# Patient Record
Sex: FEMALE | Race: WHITE | NOT HISPANIC OR LATINO
[De-identification: names, ages, dates, MRNs, and addresses within clinical notes are randomized per-mention and may not be internally consistent; named-entity substitution may affect disease eponyms.]

---
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
## 2018-03-10 ENCOUNTER — APPOINTMENT (OUTPATIENT)
Dept: PEDIATRICS | Facility: CLINIC | Age: 18
End: 2018-03-10
Payer: COMMERCIAL

## 2018-03-10 VITALS — TEMPERATURE: 98.6 F

## 2018-03-10 PROCEDURE — 86580 TB INTRADERMAL TEST: CPT

## 2018-03-10 PROCEDURE — 99213 OFFICE O/P EST LOW 20 MIN: CPT | Mod: 25

## 2018-03-11 RX ORDER — NORGESTIMATE AND ETHINYL ESTRADIOL 0.25-0.035
0.25-35 KIT ORAL
Qty: 28 | Refills: 0 | Status: COMPLETED | COMMUNITY
Start: 2017-05-22 | End: 2018-03-11

## 2018-03-12 ENCOUNTER — MED ADMIN CHARGE (OUTPATIENT)
Age: 18
End: 2018-03-12

## 2018-04-07 ENCOUNTER — APPOINTMENT (OUTPATIENT)
Dept: PEDIATRICS | Facility: CLINIC | Age: 18
End: 2018-04-07

## 2018-05-11 ENCOUNTER — APPOINTMENT (OUTPATIENT)
Dept: PEDIATRICS | Facility: CLINIC | Age: 18
End: 2018-05-11
Payer: COMMERCIAL

## 2018-05-11 PROCEDURE — 86580 TB INTRADERMAL TEST: CPT

## 2018-10-19 ENCOUNTER — APPOINTMENT (OUTPATIENT)
Dept: PEDIATRICS | Facility: CLINIC | Age: 18
End: 2018-10-19
Payer: COMMERCIAL

## 2018-10-19 VITALS — TEMPERATURE: 98.2 F

## 2018-10-19 DIAGNOSIS — L23.9 ALLERGIC CONTACT DERMATITIS, UNSPECIFIED CAUSE: ICD-10-CM

## 2018-10-19 PROCEDURE — 99213 OFFICE O/P EST LOW 20 MIN: CPT | Mod: 25

## 2018-10-20 PROBLEM — L23.9 ALLERGIC CONTACT DERMATITIS, UNSPECIFIED TRIGGER: Status: ACTIVE | Noted: 2018-10-20

## 2018-10-20 RX ORDER — HYDROCORTISONE 25 MG/G
2.5 OINTMENT TOPICAL TWICE DAILY
Qty: 1 | Refills: 0 | Status: ACTIVE | COMMUNITY
Start: 2018-10-20 | End: 1900-01-01

## 2018-10-20 NOTE — DISCUSSION/SUMMARY
[FreeTextEntry1] : Atopic dermatitis. Symptomatic treatment. If it does not improve hydrocortisone 2.5% once a day for the next 5-7 days as prescribed. Follow up if it does not improve. Followup if worse.

## 2018-10-20 NOTE — PHYSICAL EXAM
[NL] : normocephalic [de-identified] : Round dry nonerythematous patch on the right lower side of the back. The lesion is about 2 cm in diameter. It is nontender. No induration palpated. No vesicles or pustules are seen

## 2018-10-20 NOTE — HISTORY OF PRESENT ILLNESS
[de-identified] : Rash [FreeTextEntry6] : Patient was seen today for a rash on her lower back on the right side. The rash started about 7 days ago. It is somewhat itchy. It is painful to touch. There has been no blisters. Patient has not applied any cream. She has had no other rashes. Patient does not recall being in contact with anything different. She has had no other symptoms or complaints. No other concerns

## 2019-01-12 ENCOUNTER — APPOINTMENT (OUTPATIENT)
Dept: PEDIATRICS | Facility: CLINIC | Age: 19
End: 2019-01-12
Payer: COMMERCIAL

## 2019-01-12 VITALS
HEIGHT: 64 IN | SYSTOLIC BLOOD PRESSURE: 116 MMHG | WEIGHT: 119 LBS | BODY MASS INDEX: 20.32 KG/M2 | DIASTOLIC BLOOD PRESSURE: 64 MMHG | HEART RATE: 72 BPM

## 2019-01-12 PROCEDURE — 99395 PREV VISIT EST AGE 18-39: CPT

## 2019-01-12 PROCEDURE — 96160 PT-FOCUSED HLTH RISK ASSMT: CPT | Mod: 59

## 2019-01-12 PROCEDURE — 96127 BRIEF EMOTIONAL/BEHAV ASSMT: CPT

## 2019-01-13 NOTE — HISTORY OF PRESENT ILLNESS
[Mother] : mother [Goes to dentist yearly] : Patient goes to dentist yearly [Up to date] : Up to date [Normal] : normal [Eats meals with family] : eats meals with family [Is permitted and is able to make independent decisions] : Is permitted and is able to make independent decisions [Sleep Concerns] : no sleep concerns [Eats regular meals including adequate fruits and vegetables] : eats regular meals including adequate fruits and vegetables [Calcium source] : calcium source [Has friends] : has friends [At least 1 hour of physical activity a day] : at least 1 hour of physical activity a day [Screen time (except homework) less than 2 hours a day] : no screen time (except homework) less than 2 hours a day [Uses electronic nicotine delivery system] : does not use electronic nicotine delivery system [Exposure to electronic nicotine delivery system] : no exposure to electronic nicotine delivery system [Uses tobacco] : does not use tobacco [Uses drugs] : does not use drugs  [Exposure to drugs] : no exposure to drugs [Drinks alcohol] : does not drink alcohol [Exposure to alcohol] : no exposure to alcohol [Cigarette smoke exposure] : No cigarette smoke exposure [Uses safety belts/safety equipment] : uses safety belts/safety equipment  [Impaired/distracted driving] : no impaired/distracted driving [Has had sexual intercourse] : has not had sexual intercourse [Has ways to cope with stress] : has ways to cope with stress [Displays self-confidence] : displays self-confidence [Has problems with sleep] : does not have problems with sleep [Gets depressed, anxious, or irritable/has mood swings] : does not get depressed, anxious, or irritable/has mood swings [Has thought about hurting self or considered suicide] : has not thought about hurting self or considered suicide [With Teen] : teen [FreeTextEntry7] : No concerns [de-identified] : College [FreeTextEntry1] : The patient was seen today for a physical. She has no concerns. She is doing well academically and socially. She sees to GYN. She is on birth control pills. Patient does not complain of any headaches or bellyaches. No joint pains.

## 2019-01-13 NOTE — PHYSICAL EXAM

## 2019-01-13 NOTE — DISCUSSION/SUMMARY
[FreeTextEntry1] : routine care was discussed. Yearly exam.Sooner if any concerns. Followup with GYN. Safety issues discussed.

## 2019-03-22 ENCOUNTER — APPOINTMENT (OUTPATIENT)
Dept: PEDIATRICS | Facility: CLINIC | Age: 19
End: 2019-03-22
Payer: COMMERCIAL

## 2019-03-22 VITALS — TEMPERATURE: 100.2 F

## 2019-03-22 DIAGNOSIS — B34.9 VIRAL INFECTION, UNSPECIFIED: ICD-10-CM

## 2019-03-22 DIAGNOSIS — Z87.09 PERSONAL HISTORY OF OTHER DISEASES OF THE RESPIRATORY SYSTEM: ICD-10-CM

## 2019-03-22 LAB
FLUAV SPEC QL CULT: NORMAL
FLUBV AG SPEC QL IA: NORMAL
S PYO AG SPEC QL IA: NORMAL

## 2019-03-22 PROCEDURE — 87804 INFLUENZA ASSAY W/OPTIC: CPT | Mod: 59,QW

## 2019-03-22 PROCEDURE — 99214 OFFICE O/P EST MOD 30 MIN: CPT

## 2019-03-22 PROCEDURE — 87880 STREP A ASSAY W/OPTIC: CPT | Mod: QW

## 2019-03-22 RX ORDER — EFINACONAZOLE 100 MG/ML
10 SOLUTION TOPICAL
Qty: 8 | Refills: 0 | Status: ACTIVE | COMMUNITY
Start: 2019-01-17

## 2019-03-22 NOTE — DISCUSSION/SUMMARY
[FreeTextEntry1] : Viral illness. Pharyngitis. Rapid strep was negative. Culture is pending. Influenza test was also negative. Symptomatic treatment. Followup if not better over the next few days. Sooner if worse. Since patient is going off to college in 2 days both patient and mom felt better starting an antibiotic for the pharyngitis until the culture comes back. Patient will be started on amoxicillin 875 mg b.i.d. for 10 days. Will follow up with the results of the culture.

## 2019-03-22 NOTE — HISTORY OF PRESENT ILLNESS
[de-identified] : Fever congestion and sore throat [FreeTextEntry6] : Patient was seen today for fever congestion and sore throat. Patient just returned yesterday from a trip to [place]. She is feeling achy. Tired. Low-grade temperature. She is complaining of a sore throat. She is complaining of some congestion. No coughing. she has been on no medications other than decongestants and ibuprofen.She is complaining of slight headache. No rashes. No other symptoms or complaints.

## 2019-03-24 ENCOUNTER — MESSAGE (OUTPATIENT)
Age: 19
End: 2019-03-24

## 2019-03-25 LAB — BACTERIA THROAT CULT: NORMAL

## 2019-03-27 ENCOUNTER — APPOINTMENT (OUTPATIENT)
Dept: PEDIATRICS | Facility: CLINIC | Age: 19
End: 2019-03-27
Payer: COMMERCIAL

## 2019-03-27 VITALS — TEMPERATURE: 98.2 F

## 2019-03-27 DIAGNOSIS — J32.9 CHRONIC SINUSITIS, UNSPECIFIED: ICD-10-CM

## 2019-03-27 PROCEDURE — 99214 OFFICE O/P EST MOD 30 MIN: CPT

## 2019-03-27 NOTE — HISTORY OF PRESENT ILLNESS
[de-identified] : Congestion [FreeTextEntry6] : Patient was seen today for congestion. Patient is still not feeling well. She has tried over-the-counter medications along with amoxicillin. She has had recurrent nasal discharge. She is complaining of sinus pressure headache. Patient just wanted to make sure her lungs are clear since she is going back to college today. She has had no difficulty breathing. No shortness of breath. Patient has been otherwise asymptomatic.

## 2019-03-27 NOTE — DISCUSSION/SUMMARY
[FreeTextEntry1] : Sinusitis. A prescription for Augmentin 875 mg b.i.d. was prescribed. Followup if not better over the next few days. Sooner if worse. Continue Flonase.

## 2019-05-10 ENCOUNTER — APPOINTMENT (OUTPATIENT)
Dept: PEDIATRICS | Facility: CLINIC | Age: 19
End: 2019-05-10
Payer: COMMERCIAL

## 2019-05-10 PROCEDURE — 86580 TB INTRADERMAL TEST: CPT

## 2019-12-12 ENCOUNTER — MESSAGE (OUTPATIENT)
Age: 19
End: 2019-12-12

## 2019-12-26 NOTE — BEGINNING OF VISIT
[Mother] : mother [At Term] : at term [ Section] : by  section [Age Appropriate] : age appropriate developmental milestones not met [FreeTextEntry1] : 7-11 [de-identified] : repeat [FreeTextEntry5] : EIP will assess him [FreeTextEntry4] : diabetic mother

## 2020-01-25 ENCOUNTER — APPOINTMENT (OUTPATIENT)
Dept: PEDIATRICS | Facility: CLINIC | Age: 20
End: 2020-01-25
Payer: COMMERCIAL

## 2020-01-25 VITALS
DIASTOLIC BLOOD PRESSURE: 70 MMHG | BODY MASS INDEX: 21.26 KG/M2 | WEIGHT: 123 LBS | HEART RATE: 70 BPM | HEIGHT: 63.7 IN | SYSTOLIC BLOOD PRESSURE: 116 MMHG

## 2020-01-25 DIAGNOSIS — Z00.00 ENCOUNTER FOR GENERAL ADULT MEDICAL EXAMINATION W/OUT ABNORMAL FINDINGS: ICD-10-CM

## 2020-01-25 PROCEDURE — 99395 PREV VISIT EST AGE 18-39: CPT

## 2020-01-25 PROCEDURE — 96127 BRIEF EMOTIONAL/BEHAV ASSMT: CPT

## 2020-01-25 PROCEDURE — 96160 PT-FOCUSED HLTH RISK ASSMT: CPT | Mod: 59

## 2020-01-26 NOTE — HISTORY OF PRESENT ILLNESS
[Up to date] : Up to date [Normal] : normal [Eats meals with family] : eats meals with family [Sleep Concerns] : no sleep concerns [Is permitted and is able to make independent decisions] : Is permitted and is able to make independent decisions [Normal Behavior/Attention] : normal behavior/attention [Normal Performance] : normal performance [Calcium source] : calcium source [Normal Homework] : normal homework [Eats regular meals including adequate fruits and vegetables] : eats regular meals including adequate fruits and vegetables [Exposure to electronic nicotine delivery system] : no exposure to electronic nicotine delivery system [Uses electronic nicotine delivery system] : does not use electronic nicotine delivery system [Exposure to tobacco] : no exposure to tobacco [Uses tobacco] : does not use tobacco [Uses drugs] : does not use drugs  [Exposure to drugs] : no exposure to drugs [Exposure to alcohol] : exposure to alcohol [Impaired/distracted driving] : no impaired/distracted driving [Uses safety belts/safety equipment] : uses safety belts/safety equipment  [No] : Patient has not had sexual intercourse. [HIV Screening Declined] : HIV Screening Declined [Has ways to cope with stress] : has ways to cope with stress [Displays self-confidence] : displays self-confidence [Has problems with sleep] : does not have problems with sleep [Has thought about hurting self or considered suicide] : has not thought about hurting self or considered suicide [Gets depressed, anxious, or irritable/has mood swings] : does not get depressed, anxious, or irritable/has mood swings [With Teen] : teen [de-identified] : self [de-identified] : no Concerns [FreeTextEntry8] : Sees GYN. Patient on birth control pills [de-identified] : College [de-identified] : Occasional social drinking [FreeTextEntry1] : Patient was seen today for a physical. She has been doing well academically and socially. She does not complain of any headaches or bony aches. No joint pains.

## 2020-01-26 NOTE — PHYSICAL EXAM
[Alert] : alert [Normocephalic] : normocephalic [No Acute Distress] : no acute distress [EOMI Bilateral] : EOMI bilateral [Nonerythematous Oropharynx] : nonerythematous oropharynx [Clear tympanic membranes with bony landmarks and light reflex present bilaterally] : clear tympanic membranes with bony landmarks and light reflex present bilaterally  [Pink Nasal Mucosa] : pink nasal mucosa [No Palpable Masses] : no palpable masses [Supple, full passive range of motion] : supple, full passive range of motion [Normal S1, S2 audible] : normal S1, S2 audible [Clear to Auscultation Bilaterally] : clear to auscultation bilaterally [Regular Rate and Rhythm] : regular rate and rhythm [No Murmurs] : no murmurs [+2 Femoral Pulses] : +2 femoral pulses [Soft] : soft [NonTender] : non tender [Normoactive Bowel Sounds] : normoactive bowel sounds [Non Distended] : non distended [No Hepatomegaly] : no hepatomegaly [Normal Muscle Tone] : normal muscle tone [No Abnormal Lymph Nodes Palpated] : no abnormal lymph nodes palpated [No Splenomegaly] : no splenomegaly [No pain or deformities with palpation of bone, muscles, joints] : no pain or deformities with palpation of bone, muscles, joints [No Gait Asymmetry] : no gait asymmetry [Cranial Nerves Grossly Intact] : cranial nerves grossly intact [+2 Patella DTR] : +2 patella DTR [Straight] : straight [No Rash or Lesions] : no rash or lesions [FreeTextEntry6] : Not examined seeing GYN [de-identified] : Not examined seeing GYN

## 2020-01-26 NOTE — DISCUSSION/SUMMARY
[FreeTextEntry1] : Routine care discussed. Yearly exam. Sooner if any concerns. Followup with GYN. Followup with ophthalmology

## 2020-06-22 ENCOUNTER — RESULT REVIEW (OUTPATIENT)
Age: 20
End: 2020-06-22

## 2020-07-13 ENCOUNTER — APPOINTMENT (OUTPATIENT)
Dept: PEDIATRICS | Facility: CLINIC | Age: 20
End: 2020-07-13

## 2020-07-14 LAB
M TB IFN-G BLD-IMP: NEGATIVE
QUANTIFERON TB PLUS MITOGEN MINUS NIL: 6.5 IU/ML
QUANTIFERON TB PLUS NIL: 0.01 IU/ML
QUANTIFERON TB PLUS TB1 MINUS NIL: 0 IU/ML
QUANTIFERON TB PLUS TB2 MINUS NIL: 0 IU/ML

## 2020-08-11 ENCOUNTER — APPOINTMENT (OUTPATIENT)
Dept: PEDIATRICS | Facility: CLINIC | Age: 20
End: 2020-08-11
Payer: COMMERCIAL

## 2020-08-11 PROCEDURE — 90471 IMMUNIZATION ADMIN: CPT

## 2020-08-11 PROCEDURE — 90715 TDAP VACCINE 7 YRS/> IM: CPT

## 2020-09-12 ENCOUNTER — APPOINTMENT (OUTPATIENT)
Dept: PEDIATRICS | Facility: CLINIC | Age: 20
End: 2020-09-12

## 2020-11-03 ENCOUNTER — APPOINTMENT (OUTPATIENT)
Dept: PEDIATRICS | Facility: CLINIC | Age: 20
End: 2020-11-03
Payer: COMMERCIAL

## 2020-11-03 DIAGNOSIS — Z23 ENCOUNTER FOR IMMUNIZATION: ICD-10-CM

## 2020-11-03 PROCEDURE — 90471 IMMUNIZATION ADMIN: CPT

## 2020-11-03 PROCEDURE — 90651 9VHPV VACCINE 2/3 DOSE IM: CPT

## 2020-12-21 PROBLEM — Z87.09 HISTORY OF PHARYNGITIS: Status: RESOLVED | Noted: 2017-05-26 | Resolved: 2020-12-21

## 2021-01-05 ENCOUNTER — APPOINTMENT (OUTPATIENT)
Dept: PEDIATRICS | Facility: CLINIC | Age: 21
End: 2021-01-05
Payer: COMMERCIAL

## 2021-01-05 VITALS — TEMPERATURE: 97.5 F

## 2021-01-05 DIAGNOSIS — Z20.828 CONTACT WITH AND (SUSPECTED) EXPOSURE TO OTHER VIRAL COMMUNICABLE DISEASES: ICD-10-CM

## 2021-01-05 DIAGNOSIS — Z20.822 CONTACT WITH AND (SUSPECTED) EXPOSURE TO COVID-19: ICD-10-CM

## 2021-01-05 PROCEDURE — 99072 ADDL SUPL MATRL&STAF TM PHE: CPT

## 2021-01-05 PROCEDURE — 99213 OFFICE O/P EST LOW 20 MIN: CPT

## 2021-01-05 RX ORDER — AMOXICILLIN AND CLAVULANATE POTASSIUM 875; 125 MG/1; MG/1
875-125 TABLET, COATED ORAL
Qty: 20 | Refills: 0 | Status: DISCONTINUED | COMMUNITY
Start: 2019-03-27 | End: 2021-01-05

## 2021-01-05 RX ORDER — AMOXICILLIN 875 MG/1
875 TABLET, FILM COATED ORAL
Qty: 20 | Refills: 0 | Status: DISCONTINUED | COMMUNITY
Start: 2019-03-22 | End: 2021-01-05

## 2021-01-05 NOTE — HISTORY OF PRESENT ILLNESS
[de-identified] : Covid exposure [FreeTextEntry6] : 21y/o young woman presents for Covid testing s/p exposure on 12/31/2020 to a friend who subsequently tested positive. Pt is without symptoms. No fever. No cough, congestion, SOB or URI sx. No n/v/d. No headache, abdominal pain or rash. No body aches or fatigue. No loss of smell or taste. Good po/uop/bm. Normal sleep and activity.\par

## 2021-01-05 NOTE — PHYSICAL EXAM
[FROM] : full passive range of motion [Moves All Extremities x 4] : moves all extremities x4 [NL] : warm [FreeTextEntry7] : no increased work of breathing

## 2021-01-05 NOTE — DISCUSSION/SUMMARY
[FreeTextEntry1] : Covid PCR sent to lab.\par Will follow up by phone once results are available.\par Parent/pt aware of need to quarantine regardless of test results.\par

## 2021-01-08 ENCOUNTER — NON-APPOINTMENT (OUTPATIENT)
Age: 21
End: 2021-01-08

## 2021-01-08 LAB — SARS-COV-2 N GENE NPH QL NAA+PROBE: NOT DETECTED

## 2021-01-12 ENCOUNTER — NON-APPOINTMENT (OUTPATIENT)
Age: 21
End: 2021-01-12

## 2021-01-13 ENCOUNTER — APPOINTMENT (OUTPATIENT)
Dept: PEDIATRICS | Facility: CLINIC | Age: 21
End: 2021-01-13
Payer: COMMERCIAL

## 2021-01-13 VITALS — TEMPERATURE: 98.2 F

## 2021-01-13 DIAGNOSIS — Z20.822 CONTACT WITH AND (SUSPECTED) EXPOSURE TO COVID-19: ICD-10-CM

## 2021-01-13 PROCEDURE — 99072 ADDL SUPL MATRL&STAF TM PHE: CPT

## 2021-01-13 PROCEDURE — 99212 OFFICE O/P EST SF 10 MIN: CPT

## 2021-01-13 NOTE — HISTORY OF PRESENT ILLNESS
[de-identified] : Covid exposure [FreeTextEntry6] : 21y/o girl presents for Covid testing s/p exposure 5 days ago. Pt is without symptoms. No fever. No cough, congestion, SOB or URI sx. No n/v/d. No headache, abdominal pain, sore throat or rash. No body aches or fatigue. No loss of smell or taste. Good po/uop/bm. Normal sleep and activity.\par

## 2021-01-15 LAB — SARS-COV-2 N GENE NPH QL NAA+PROBE: NOT DETECTED

## 2021-06-23 ENCOUNTER — APPOINTMENT (OUTPATIENT)
Dept: OBGYN | Facility: CLINIC | Age: 21
End: 2021-06-23

## 2021-11-05 ENCOUNTER — APPOINTMENT (OUTPATIENT)
Dept: OBGYN | Facility: CLINIC | Age: 21
End: 2021-11-05
Payer: COMMERCIAL

## 2021-11-05 VITALS — HEIGHT: 64 IN | DIASTOLIC BLOOD PRESSURE: 70 MMHG | SYSTOLIC BLOOD PRESSURE: 108 MMHG

## 2021-11-05 DIAGNOSIS — Z01.419 ENCOUNTER FOR GYNECOLOGICAL EXAMINATION (GENERAL) (ROUTINE) W/OUT ABNORMAL FINDINGS: ICD-10-CM

## 2021-11-05 PROCEDURE — 99395 PREV VISIT EST AGE 18-39: CPT

## 2021-11-10 LAB — CYTOLOGY CVX/VAG DOC THIN PREP: NORMAL

## 2022-11-10 ENCOUNTER — APPOINTMENT (OUTPATIENT)
Dept: OBGYN | Facility: CLINIC | Age: 22
End: 2022-11-10

## 2022-11-10 VITALS
HEIGHT: 64 IN | SYSTOLIC BLOOD PRESSURE: 115 MMHG | WEIGHT: 122 LBS | DIASTOLIC BLOOD PRESSURE: 77 MMHG | BODY MASS INDEX: 20.83 KG/M2

## 2022-11-10 PROCEDURE — 99395 PREV VISIT EST AGE 18-39: CPT

## 2022-11-14 LAB
C TRACH RRNA SPEC QL NAA+PROBE: NOT DETECTED
N GONORRHOEA RRNA SPEC QL NAA+PROBE: NOT DETECTED
SOURCE TP AMPLIFICATION: NORMAL

## 2022-11-17 LAB — CYTOLOGY CVX/VAG DOC THIN PREP: NORMAL

## 2022-12-06 ENCOUNTER — NON-APPOINTMENT (OUTPATIENT)
Age: 22
End: 2022-12-06

## 2023-03-06 ENCOUNTER — NON-APPOINTMENT (OUTPATIENT)
Age: 23
End: 2023-03-06

## 2023-09-27 NOTE — REVIEW OF SYSTEMS
[FreeTextEntry1] : review of system normal other than stated in the history of present illness Z Plasty Text: The lesion was extirpated to the level of the fat with a #15 scalpel blade.  Given the location of the defect, shape of the defect and the proximity to free margins a Z-plasty was deemed most appropriate for repair.  Using a sterile surgical marker, the appropriate transposition arms of the Z-plasty were drawn incorporating the defect and placing the expected incisions within the relaxed skin tension lines where possible.    The area thus outlined was incised deep to adipose tissue with a #15 scalpel blade.  The skin margins were undermined to an appropriate distance in all directions utilizing iris scissors.  The opposing transposition arms were then transposed into place in opposite direction and anchored with interrupted buried subcutaneous sutures.

## 2023-11-16 ENCOUNTER — OFFICE (OUTPATIENT)
Dept: URBAN - METROPOLITAN AREA CLINIC 100 | Facility: CLINIC | Age: 23
Setting detail: OPHTHALMOLOGY
End: 2023-11-16
Payer: COMMERCIAL

## 2023-11-16 DIAGNOSIS — H01.004: ICD-10-CM

## 2023-11-16 DIAGNOSIS — H00.11: ICD-10-CM

## 2023-11-16 DIAGNOSIS — H00.14: ICD-10-CM

## 2023-11-16 DIAGNOSIS — H01.001: ICD-10-CM

## 2023-11-16 PROCEDURE — 92285 EXTERNAL OCULAR PHOTOGRAPHY: CPT | Performed by: OPHTHALMOLOGY

## 2023-11-16 PROCEDURE — 92002 INTRM OPH EXAM NEW PATIENT: CPT | Performed by: OPHTHALMOLOGY

## 2023-11-16 ASSESSMENT — CONFRONTATIONAL VISUAL FIELD TEST (CVF)
OD_FINDINGS: FULL
OS_FINDINGS: FULL

## 2023-11-16 ASSESSMENT — LID EXAM ASSESSMENTS
OS_BLEPHARITIS: LUL
OD_BLEPHARITIS: RUL

## 2023-12-05 ENCOUNTER — APPOINTMENT (OUTPATIENT)
Dept: OBGYN | Facility: CLINIC | Age: 23
End: 2023-12-05
Payer: COMMERCIAL

## 2023-12-05 VITALS
WEIGHT: 120 LBS | SYSTOLIC BLOOD PRESSURE: 104 MMHG | BODY MASS INDEX: 20.49 KG/M2 | HEIGHT: 64 IN | DIASTOLIC BLOOD PRESSURE: 73 MMHG

## 2023-12-05 DIAGNOSIS — Z01.419 ENCOUNTER FOR GYNECOLOGICAL EXAMINATION (GENERAL) (ROUTINE) W/OUT ABNORMAL FINDINGS: ICD-10-CM

## 2023-12-05 PROCEDURE — 99395 PREV VISIT EST AGE 18-39: CPT

## 2023-12-11 LAB — CYTOLOGY CVX/VAG DOC THIN PREP: NORMAL

## 2024-05-22 ENCOUNTER — NON-APPOINTMENT (OUTPATIENT)
Age: 24
End: 2024-05-22

## 2024-07-08 ENCOUNTER — NON-APPOINTMENT (OUTPATIENT)
Age: 24
End: 2024-07-08

## 2024-11-14 ENCOUNTER — APPOINTMENT (OUTPATIENT)
Dept: OBGYN | Facility: CLINIC | Age: 24
End: 2024-11-14
Payer: COMMERCIAL

## 2024-11-14 VITALS — DIASTOLIC BLOOD PRESSURE: 67 MMHG | SYSTOLIC BLOOD PRESSURE: 96 MMHG

## 2024-11-14 DIAGNOSIS — N93.8 OTHER SPECIFIED ABNORMAL UTERINE AND VAGINAL BLEEDING: ICD-10-CM

## 2024-11-14 PROCEDURE — 99214 OFFICE O/P EST MOD 30 MIN: CPT

## 2024-11-14 PROCEDURE — 99459 PELVIC EXAMINATION: CPT

## 2024-11-14 RX ORDER — NORETHINDRONE ACETATE AND ETHINYL ESTRADIOL 1MG-20(24)
1-20 KIT ORAL DAILY
Qty: 3 | Refills: 3 | Status: ACTIVE | COMMUNITY
Start: 2024-11-14 | End: 1900-01-01

## 2024-11-15 LAB
FSH SERPL-MCNC: 7.2 IU/L
LH SERPL-ACNC: 7.1 IU/L
PROLACTIN SERPL-MCNC: 6.7 NG/ML
T4 FREE SERPL-MCNC: 1.4 NG/DL
TSH SERPL-ACNC: 1.18 UIU/ML

## 2024-11-18 LAB
C TRACH RRNA SPEC QL NAA+PROBE: NOT DETECTED
N GONORRHOEA RRNA SPEC QL NAA+PROBE: NOT DETECTED
SOURCE AMPLIFICATION: NORMAL

## 2024-11-29 ENCOUNTER — EMERGENCY (EMERGENCY)
Facility: HOSPITAL | Age: 24
LOS: 1 days | Discharge: HOME | End: 2024-11-29
Attending: EMERGENCY MEDICINE
Payer: COMMERCIAL

## 2024-11-29 VITALS
SYSTOLIC BLOOD PRESSURE: 124 MMHG | WEIGHT: 119.93 LBS | HEART RATE: 89 BPM | TEMPERATURE: 98 F | RESPIRATION RATE: 18 BRPM | DIASTOLIC BLOOD PRESSURE: 84 MMHG | OXYGEN SATURATION: 98 % | HEIGHT: 64 IN

## 2024-11-29 DIAGNOSIS — N94.2 VAGINISMUS: Chronic | ICD-10-CM

## 2024-11-29 PROCEDURE — 99282 EMERGENCY DEPT VISIT SF MDM: CPT

## 2024-11-29 PROCEDURE — 99053 MED SERV 10PM-8AM 24 HR FAC: CPT

## 2024-11-29 PROCEDURE — 99283 EMERGENCY DEPT VISIT LOW MDM: CPT

## 2024-11-29 RX ORDER — ERYTHROMYCIN BASE 5 MG/GRAM
1 OINTMENT (GRAM) OPHTHALMIC (EYE)
Qty: 1 | Refills: 0
Start: 2024-11-29 | End: 2024-12-05

## 2024-11-29 NOTE — ED ADULT NURSE NOTE - OBJECTIVE STATEMENT
The pt is a 24 Y F with no PMH. 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 Pt states that she washed her eyes out right away and still co burning in both eyes. Pt is AOx4 breathing evenly on room air and able to speak in full sentences. pt denies head ache fevers, chills, n/v/d/c/ numbness and tingling. pt was able to ambulate on her own to the room VSS and pt in a stretcher with comfort and safety measures provided,

## 2024-11-29 NOTE — ED PROVIDER NOTE - PHYSICAL EXAMINATION
GENERAL: NAD  HEENT:  Atraumatic. EOM intact. non erythematous conjunctiva, PEERLA  CHEST/LUNG: Chest rise equal bilaterally  HEART: Regular rate and rhythm  EXTREMITIES:  Extremities warm  PSYCH: A&Ox3

## 2024-11-29 NOTE — ED ADULT TRIAGE NOTE - CHIEF COMPLAINT QUOTE
exposure to amniotic fluid to b/l eyes, pt reports flushing eyes immediate post exposure, reports stinging sensation to b/l eyes

## 2024-11-29 NOTE — ED PROVIDER NOTE - PATIENT PORTAL LINK FT
You can access the FollowMyHealth Patient Portal offered by [facility] by registering at the following website: [email or web address]. By joining [employer]’s FollowMyHealth portal, you will also be able to view your health information using other applications (apps) compatible with our system.

## 2024-11-29 NOTE — ED ADULT TRIAGE NOTE - GLASGOW COMA SCALE: BEST MOTOR RESPONSE, MLM
Progress Notes by Elaina Warner NCMA at 03/23/18 10:23 AM     Author:  Elaina Warner NCMA Service:  (none) Author Type:  Certified Medical Assistant     Filed:  03/23/18 10:23 AM Encounter Date:  3/23/2018 Status:  Signed     :  Elaina Warner NCMA (Certified Medical Assistant)              Ramana Pacheco received[KS1.1T] Td 0.5[KS1.1M]mg injection and was administered uneventfully.    We have recommended to patient/parent/guardian that they should wait 15 minutes after receiving an injection.     Electronically Signed by:    ALEX Galvan , 3/23/2018[KS1.1T]      Revision History        User Key Date/Time User Provider Type Action    > KS1.1 03/23/18 10:23 AM Elaina Warner NCMA Certified Medical Assistant Sign    M - Manual, T - Template             (M6) obeys commands

## 2024-11-29 NOTE — ED PROVIDER NOTE - ATTENDING CONTRIBUTION TO CARE
Pt with amniotic fluid to eye prior to arrival, +eye wash, no sxs currently.  Immniz UTD.      PE: well appearing, nontoxic, no respiratory distress.  Neuro nonfocal.  Skin intact. Psych normal mood.    MDM: bodily fluid exposure to eyes, low but present risk for bacterial infection, start empiric optho antibiotics.

## 2024-11-29 NOTE — ED PROVIDER NOTE - OBJECTIVE STATEMENT
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  Patient was delivering an about 3 cc of amniotic fluid splashed into her eyes.  15 minutes after exposure she went to the eyewash station for 1 minute.  Denies eye pain, no visual changes, no eye redness, no eye irritation.  Patient states the sores is confirmed HIV negative, and hepatitis negative.

## 2024-12-05 ENCOUNTER — APPOINTMENT (OUTPATIENT)
Dept: OBGYN | Facility: CLINIC | Age: 24
End: 2024-12-05
Payer: COMMERCIAL

## 2024-12-05 ENCOUNTER — ASOB RESULT (OUTPATIENT)
Age: 24
End: 2024-12-05

## 2024-12-05 PROCEDURE — 76830 TRANSVAGINAL US NON-OB: CPT

## 2024-12-12 ENCOUNTER — NON-APPOINTMENT (OUTPATIENT)
Age: 24
End: 2024-12-12

## 2024-12-29 NOTE — ED PROVIDER NOTE - CLINICAL SUMMARY MEDICAL DECISION MAKING FREE TEXT BOX
Pt is afebrile, well-appearing, with no visual symptoms in ED.  Though patient knows source is HIV negative and hepatitis negative wants to get tested herself.  Offered PEP prophylaxis, patient declined due to low risk.  Patient understands risks and benefits.  Will draw blood and DC home. Will prescribe eye abx ointment.
.

## 2025-01-23 ENCOUNTER — NON-APPOINTMENT (OUTPATIENT)
Age: 25
End: 2025-01-23

## 2025-01-23 ENCOUNTER — APPOINTMENT (OUTPATIENT)
Dept: OBGYN | Facility: CLINIC | Age: 25
End: 2025-01-23
Payer: COMMERCIAL

## 2025-01-23 VITALS
BODY MASS INDEX: 21 KG/M2 | DIASTOLIC BLOOD PRESSURE: 66 MMHG | SYSTOLIC BLOOD PRESSURE: 106 MMHG | HEIGHT: 64 IN | WEIGHT: 123 LBS

## 2025-01-23 DIAGNOSIS — Z01.419 ENCOUNTER FOR GYNECOLOGICAL EXAMINATION (GENERAL) (ROUTINE) W/OUT ABNORMAL FINDINGS: ICD-10-CM

## 2025-01-23 DIAGNOSIS — Z13.31 ENCOUNTER FOR SCREENING FOR DEPRESSION: ICD-10-CM

## 2025-01-23 DIAGNOSIS — Z30.41 ENCOUNTER FOR SURVEILLANCE OF CONTRACEPTIVE PILLS: ICD-10-CM

## 2025-01-23 PROCEDURE — 99459 PELVIC EXAMINATION: CPT

## 2025-01-23 PROCEDURE — 99395 PREV VISIT EST AGE 18-39: CPT

## 2025-01-23 PROCEDURE — G0444 DEPRESSION SCREEN ANNUAL: CPT | Mod: 59

## 2025-01-28 LAB — CYTOLOGY CVX/VAG DOC THIN PREP: NORMAL

## 2025-02-04 ENCOUNTER — EMERGENCY (EMERGENCY)
Facility: HOSPITAL | Age: 25
LOS: 1 days | Discharge: HOME | End: 2025-02-04
Attending: EMERGENCY MEDICINE
Payer: COMMERCIAL

## 2025-02-04 VITALS
OXYGEN SATURATION: 99 % | RESPIRATION RATE: 18 BRPM | HEART RATE: 89 BPM | DIASTOLIC BLOOD PRESSURE: 89 MMHG | WEIGHT: 119.93 LBS | HEIGHT: 64 IN | TEMPERATURE: 98 F | SYSTOLIC BLOOD PRESSURE: 127 MMHG

## 2025-02-04 DIAGNOSIS — N94.2 VAGINISMUS: Chronic | ICD-10-CM

## 2025-02-04 PROCEDURE — 99053 MED SERV 10PM-8AM 24 HR FAC: CPT

## 2025-02-04 PROCEDURE — 99282 EMERGENCY DEPT VISIT SF MDM: CPT

## 2025-02-04 PROCEDURE — 99283 EMERGENCY DEPT VISIT LOW MDM: CPT

## 2025-02-04 NOTE — ED PROVIDER NOTE - CLINICAL SUMMARY MEDICAL DECISION MAKING FREE TEXT BOX
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  - Del Rebollar MD   --------------------------------------------------------------

## 2025-02-04 NOTE — ED ADULT NURSE NOTE - OBJECTIVE STATEMENT
24f [id number] kiera, a Labor and delivery RN, was drawing blood culture from a patient where she accidentally stuck her Rt Thumb after drawing the blood with a butterfly needle. Patient washed her hands right away and came down here in the ED to get lab work. Hetal dumont evaluated by Dr Rebollar, labs drawn pending results. Forms filled up by patient. Patient pending registration and dc.

## 2025-02-04 NOTE — ED PROVIDER NOTE - PATIENT PORTAL LINK FT
You can access the FollowMyHealth Patient Portal offered by [facility] by registering at the following website: [email or web address]. By joining [employer]’s FollowMyHealth portal, you will also be able to view your health information using other applications (apps) compatible with our system.

## 2025-02-04 NOTE — ED PROVIDER NOTE - NSFOLLOWUPINSTRUCTIONS_ED_ALL_ED_FT
See [facility] [employer] this week for follow up -- call to discuss.    See BODY FLUID EXPOSURE information and return instructions given to you.

## 2025-02-24 ENCOUNTER — TRANSCRIPTION ENCOUNTER (OUTPATIENT)
Age: 25
End: 2025-02-24

## 2025-03-27 ENCOUNTER — APPOINTMENT (OUTPATIENT)
Dept: OBGYN | Facility: CLINIC | Age: 25
End: 2025-03-27
Payer: COMMERCIAL

## 2025-03-27 VITALS — DIASTOLIC BLOOD PRESSURE: 76 MMHG | SYSTOLIC BLOOD PRESSURE: 119 MMHG

## 2025-03-27 DIAGNOSIS — N63.0 UNSPECIFIED LUMP IN UNSPECIFIED BREAST: ICD-10-CM

## 2025-03-27 PROCEDURE — 99213 OFFICE O/P EST LOW 20 MIN: CPT

## 2025-03-28 ENCOUNTER — OUTPATIENT (OUTPATIENT)
Dept: OUTPATIENT SERVICES | Facility: HOSPITAL | Age: 25
LOS: 1 days | End: 2025-03-28
Payer: COMMERCIAL

## 2025-03-28 ENCOUNTER — APPOINTMENT (OUTPATIENT)
Dept: ULTRASOUND IMAGING | Facility: CLINIC | Age: 25
End: 2025-03-28
Payer: COMMERCIAL

## 2025-03-28 ENCOUNTER — NON-APPOINTMENT (OUTPATIENT)
Age: 25
End: 2025-03-28

## 2025-03-28 ENCOUNTER — RESULT REVIEW (OUTPATIENT)
Age: 25
End: 2025-03-28

## 2025-03-28 DIAGNOSIS — N63.0 UNSPECIFIED LUMP IN UNSPECIFIED BREAST: ICD-10-CM

## 2025-03-28 PROCEDURE — 76642 ULTRASOUND BREAST LIMITED: CPT

## 2025-03-28 PROCEDURE — 76642 ULTRASOUND BREAST LIMITED: CPT | Mod: 26,LT

## 2025-04-08 ENCOUNTER — APPOINTMENT (OUTPATIENT)
Dept: OBGYN | Facility: CLINIC | Age: 25
End: 2025-04-08
Payer: COMMERCIAL

## 2025-04-08 VITALS — DIASTOLIC BLOOD PRESSURE: 80 MMHG | SYSTOLIC BLOOD PRESSURE: 118 MMHG

## 2025-04-08 DIAGNOSIS — N63.0 UNSPECIFIED LUMP IN UNSPECIFIED BREAST: ICD-10-CM

## 2025-04-08 DIAGNOSIS — N76.0 ACUTE VAGINITIS: ICD-10-CM

## 2025-04-08 PROCEDURE — 99213 OFFICE O/P EST LOW 20 MIN: CPT

## 2025-04-16 ENCOUNTER — NON-APPOINTMENT (OUTPATIENT)
Age: 25
End: 2025-04-16

## 2025-04-24 ENCOUNTER — APPOINTMENT (OUTPATIENT)
Dept: SURGICAL ONCOLOGY | Facility: CLINIC | Age: 25
End: 2025-04-24
Payer: COMMERCIAL

## 2025-04-24 VITALS
WEIGHT: 122 LBS | SYSTOLIC BLOOD PRESSURE: 102 MMHG | DIASTOLIC BLOOD PRESSURE: 67 MMHG | BODY MASS INDEX: 20.83 KG/M2 | HEART RATE: 68 BPM | OXYGEN SATURATION: 99 % | HEIGHT: 64 IN

## 2025-04-24 DIAGNOSIS — N63.0 UNSPECIFIED LUMP IN UNSPECIFIED BREAST: ICD-10-CM

## 2025-04-24 PROCEDURE — 99245 OFF/OP CONSLTJ NEW/EST HI 55: CPT

## 2025-07-31 ENCOUNTER — APPOINTMENT (OUTPATIENT)
Dept: SURGICAL ONCOLOGY | Facility: CLINIC | Age: 25
End: 2025-07-31
Payer: COMMERCIAL

## 2025-07-31 ENCOUNTER — NON-APPOINTMENT (OUTPATIENT)
Age: 25
End: 2025-07-31

## 2025-07-31 VITALS
HEART RATE: 73 BPM | TEMPERATURE: 98.7 F | DIASTOLIC BLOOD PRESSURE: 81 MMHG | BODY MASS INDEX: 20.49 KG/M2 | OXYGEN SATURATION: 99 % | SYSTOLIC BLOOD PRESSURE: 119 MMHG | RESPIRATION RATE: 17 BRPM | HEIGHT: 64 IN | WEIGHT: 120 LBS

## 2025-07-31 DIAGNOSIS — N63.0 UNSPECIFIED LUMP IN UNSPECIFIED BREAST: ICD-10-CM

## 2025-07-31 PROCEDURE — 99214 OFFICE O/P EST MOD 30 MIN: CPT
